# Patient Record
Sex: MALE | Race: WHITE
[De-identification: names, ages, dates, MRNs, and addresses within clinical notes are randomized per-mention and may not be internally consistent; named-entity substitution may affect disease eponyms.]

---

## 2024-05-13 ENCOUNTER — HOSPITAL ENCOUNTER (OUTPATIENT)
Dept: PHYSICAL THERAPY | Facility: CLINIC | Age: 34
Setting detail: THERAPIES SERIES
Discharge: HOME | End: 2024-05-13
Payer: COMMERCIAL

## 2024-05-13 PROCEDURE — 97161 PT EVAL LOW COMPLEX 20 MIN: CPT

## 2024-05-13 PROCEDURE — 97110 THERAPEUTIC EXERCISES: CPT

## 2024-05-13 NOTE — CONSULTS
[] [facility]  Outpatient Rehabilitation &  Therapy  [address]  [phone]  F: [phone] [] [facility]  Outpatient Rehabilitation &  Therapy  [address]   [address]  P: [phone]  F: [phone] [x] [facility]  Outpatient Rehabilitation &  Therapy  [address]  P: [phone]  F: [phone] [] [facility]  Outpatient Rehabilitation &  Therapy  [address]   P: [phone]  F: [phone]  [] [facility]  Outpatient Rehabilitation &  Therapy  [address]  P: [phone]  F: [phone]          Physical Therapy Upper Extremity Evaluation    Date:  2024  Patient: Jaylen Russ  :  1990  MRN: [id number]  Physician: Reese Delvalle MD     Insurance: [employer] ($50 copay, 60/60 remains, hard max)   Medical Diagnosis: M75.81 (ICD-10-CM) - Other shoulder lesions, right shoulder   Rehab Codes: M25.511, M25.611, R29.3, M62.81  Onset date: 3/1/24  Next 's appt.: TBD      Subjective:  Pt arrived to physical therapy with c/o recent onset of R shoulder pain without known cause.  Pt reported pain typically worsen after getting out of shower and discomfort when laying on R side causing \"sharp\" pain.  Pt reported prescribed oral steroid meds which help reducing symptoms and massage therapist also help relieving pain more than steroid pack.  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 (+) R hand dominant.     Pain present? [x] Yes  [] No    Location R shoulder    Pain Rating currently  (0-10 scale)  1/10   Pain at worse 5/10   Pain at best 0/10   Description of pain Intermittent ache, sharp   Altered Sensation Intermittent numbness in lower forearm    What makes it worse Getting out of shower, laying on R side   What makes it better Movements    Symptom progression

## 2024-05-23 ENCOUNTER — HOSPITAL ENCOUNTER (OUTPATIENT)
Dept: PHYSICAL THERAPY | Facility: CLINIC | Age: 34
Setting detail: THERAPIES SERIES
Discharge: HOME | End: 2024-05-23
Payer: COMMERCIAL

## 2024-05-23 PROCEDURE — 97110 THERAPEUTIC EXERCISES: CPT

## 2024-05-23 NOTE — FLOWSHEET NOTE
[] [facility]  Outpatient Rehabilitation &  Therapy  [address]  [phone]  [phone] [] [facility]  Outpatient Rehabilitation &  Therapy  [address]  P: [phone]  F: [phone] [x] [facility]  Outpatient Rehabilitation &  Therapy  [address]  P: [phone]  F: [phone] [] [facility]  Outpatient Rehabilitation &  Therapy  [address]  [phone]  [phone] [] [facility]  Outpatient Rehabilitation &  Therapy  [address]   P: [phone]  F: [phone]  [] [facility]  Outpatient Rehabilitation &  Therapy  [address]  P: [phone]  F: [phone] [] [facility]  Outpatient Rehabilitation &  Therapy  [address]  P: [phone]  F: [phone] [] [facility]  Outpatient Rehabilitation &  Therapy  [address]  P: [phone]  F: [phone] [] [facility]  Outpatient Rehabilitation &  Therapy  [address]  P: [phone]  F: [phone]  [] [facility] Outpatient Rehabilitation &  Therapy  [address]  P: [phone]  F: [phone]     Physical Therapy Daily Treatment Note    Date:  2024  Patient Name:  Jaylen Russ    :  1990  MRN: [id number]  Physician: Reese Delvalle MD                         Insurance: [employer] ($50 copay, 60/60 remains, hard max)   Medical Diagnosis: M75.81 (ICD-10-CM) - Other shoulder lesions, right shoulder   Rehab Codes: M25.511, M25.611, R29.3, M62.81  Onset date: 3/1/24                 Next Dr's appt.: TBD  Visit# / total visits:      Cancels/No Shows:     Subjective:    Pain:  [] Yes  [x] No Location: R shoulder Pain Rating: (0-10 scale) 0/10  Pain altered Tx:  [x] No  [] Yes  Action:  Comments: Pt with no pain 
2 to 4 times a month

## 2024-05-28 ENCOUNTER — HOSPITAL ENCOUNTER (OUTPATIENT)
Dept: PHYSICAL THERAPY | Facility: CLINIC | Age: 34
Setting detail: THERAPIES SERIES
Discharge: HOME | End: 2024-05-28
Payer: COMMERCIAL

## 2024-05-28 PROCEDURE — 97110 THERAPEUTIC EXERCISES: CPT

## 2024-05-28 NOTE — FLOWSHEET NOTE
[] [facility]  Outpatient Rehabilitation &  Therapy  [address]  [phone]  [phone] [] [facility]  Outpatient Rehabilitation &  Therapy  [address]  P: [phone]  F: [phone] [x] [facility]  Outpatient Rehabilitation &  Therapy  [address]  P: [phone]  F: [phone] [] [facility]  Outpatient Rehabilitation &  Therapy  [address]  [phone]  [phone] [] [facility]  Outpatient Rehabilitation &  Therapy  [address]   P: [phone]  F: [phone]  [] [facility]  Outpatient Rehabilitation &  Therapy  [address]  P: [phone]  F: [phone] [] [facility]  Outpatient Rehabilitation &  Therapy  [address]  P: [phone]  F: [phone] [] [facility]  Outpatient Rehabilitation &  Therapy  [address]  P: [phone]  F: [phone] [] [facility]  Outpatient Rehabilitation &  Therapy  [address]  P: [phone]  F: [phone]  [] [facility] Outpatient Rehabilitation &  Therapy  [address]  P: [phone]  F: [phone]     Physical Therapy Daily Treatment Note    Date:  2024  Patient Name:  Jaylen Russ    :  1990  MRN: [id number]  Physician: Reese Delvalle MD                         Insurance: [employer] ($50 copay, 60/60 remains, hard max)   Medical Diagnosis: M75.81 (ICD-10-CM) - Other shoulder lesions, right shoulder   Rehab Codes: M25.511, M25.611, R29.3, M62.81  Onset date: 3/1/24                 Next Dr's appt.: TBD  Visit# / total visits: 3/8     Cancels/No Shows:     Subjective:    Pain:  [] Yes  [x] No Location: R shoulder Pain Rating: (0-10 scale) 0/10  Pain altered Tx:  [x] No  [] Yes  Action:  Comments: Pt arrived to

## 2024-06-04 ENCOUNTER — HOSPITAL ENCOUNTER (OUTPATIENT)
Dept: PHYSICAL THERAPY | Facility: CLINIC | Age: 34
Setting detail: THERAPIES SERIES
Discharge: HOME | End: 2024-06-04
Payer: COMMERCIAL

## 2024-06-04 PROCEDURE — 97110 THERAPEUTIC EXERCISES: CPT

## 2024-06-04 NOTE — FLOWSHEET NOTE
5sec hold  - Standing Shoulder Horizontal Abduction with Resistance  - 1 x daily - 7 x weekly - 2 sets - 10 reps     Comprehension of Education:  [x] Verbalizes understanding.  [x] Demonstrates understanding.  [] Needs review.   [] Demonstrates/verbalizes HEP/Ed previously given.     Plan: [x] Continue current frequency toward long and short term goals.    [x] Specific Instructions for subsequent treatments: see above      Time In: 6:02 pm            Time Out: 6:58 pm    Electronically signed by:  Nubia Buitrago PT

## 2024-06-11 ENCOUNTER — HOSPITAL ENCOUNTER (OUTPATIENT)
Dept: PHYSICAL THERAPY | Facility: CLINIC | Age: 34
Setting detail: THERAPIES SERIES
Discharge: HOME | End: 2024-06-11
Payer: COMMERCIAL

## 2024-06-11 PROCEDURE — 97110 THERAPEUTIC EXERCISES: CPT

## 2024-06-11 NOTE — FLOWSHEET NOTE
[] [facility]  Outpatient Rehabilitation &  Therapy  [address]  [phone]  [phone] [] [facility]  Outpatient Rehabilitation &  Therapy  [address]  P: [phone]  F: [phone] [x] [facility]  Outpatient Rehabilitation &  Therapy  [address]  P: [phone]  F: [phone] [] [facility]  Outpatient Rehabilitation &  Therapy  [address]  [phone]  [phone] [] [facility]  Outpatient Rehabilitation &  Therapy  [address]   P: [phone]  F: [phone]  [] [facility]  Outpatient Rehabilitation &  Therapy  [address]  P: [phone]  F: [phone] [] [facility]  Outpatient Rehabilitation &  Therapy  [address]  P: [phone]  F: [phone] [] [facility]  Outpatient Rehabilitation &  Therapy  [address]  P: [phone]  F: [phone] [] [facility]  Outpatient Rehabilitation &  Therapy  [address]  P: [phone]  F: [phone]  [] [facility] Outpatient Rehabilitation &  Therapy  [address]  P: [phone]  F: [phone]     Physical Therapy Daily Treatment Note    Date:  2024  Patient Name:  Jaylen Russ    :  1990  MRN: [id number]  Physician: Reese Delvalle MD                         Insurance: [employer] ($50 copay, 60/60 remains, hard max)   Medical Diagnosis: M75.81 (ICD-10-CM) - Other shoulder lesions, right shoulder   Rehab Codes: M25.511, M25.611, R29.3, M62.81  Onset date: 3/1/24                 Next Dr's appt.: TBD  Visit# / total visits:      Cancels/No Shows:     Subjective:    Pain:  [x] Yes  [] No Location: R shoulder Pain Rating: (0-10 scale) 0/10  Pain altered Tx:  [x] No  [] Yes  Action:  Comments: Pt states

## 2024-06-18 ENCOUNTER — HOSPITAL ENCOUNTER (OUTPATIENT)
Dept: PHYSICAL THERAPY | Facility: CLINIC | Age: 34
Setting detail: THERAPIES SERIES
Discharge: HOME | End: 2024-06-18
Payer: COMMERCIAL

## 2024-06-18 PROCEDURE — 97110 THERAPEUTIC EXERCISES: CPT

## 2024-06-18 NOTE — FLOWSHEET NOTE
BUE/scapulothoracic stabilizers with good tolerance noted.  Progress standing theraband exs includes flexion pulse, wall tap, and added wall walk to further progress strength and stability of shoulder/periscapular ms without any issue noted.  Pt denied inc of pain post-exs.      [] No change.     [] Other:  [x] Patient would continue to benefit from skilled physical therapy services in order to: meet goals listed below     STG: (to be met in 8 treatments)  Pt will decrease pain to less than or equal to 3/10 with ADLs  Pt will improve B shoulder AROM to at least 160° flexion/abduction with report of minimal pain to ease difficulty with overhead activities  Pt will improve B shoulder strength to at least 4/5 and scapulothoracic stabilizers strength to at least 4-/5 throughout to ease difficulty with prolonged driving at work   Patient to be independent with home exercise program as demonstrated by performance with correct form without cues.  Pt will improve UEFS score to <3.25% impaired to demo improved functional mobility to participate in daily functional activities                  Patient goals: \"to figure out what is going on\"        Pt. Education:  [] Yes  [x] No  [] Reviewed Prior HEP/Ed  Method of Education: [] Verbal  [] Demo  [] Written  Access Code: [email or web address]  URL: [email or web address]  Date: 05/13/2024  Prepared by: Nubia Buitrago     Exercises  - Seated Cervical Sidebending Stretch  - 1 x daily - 7 x weekly - 1 sets - 3 reps - 30sec hold  - Seated Levator Scapulae Stretch  - 1 x daily - 7 x weekly - 1 sets - 3 reps - 30sec hold  - Seated Cervical Retraction  - 1 x daily - 7 x weekly - 2 sets - 10 reps - 5sec hold  - Seated Scapular Retraction  - 1 x daily - 7 x weekly - 2 sets - 10 reps - 5sec hold  - Doorway Pec Stretch at 90 Degrees Abduction  - 1 x daily - 7 x weekly - 1 sets - 3 reps - 30sec hold      Date: 06/04/2024  Prepared by: Nubia Martell  - Prone Scapular Slide with Shoulder Extension

## 2024-06-26 ENCOUNTER — HOSPITAL ENCOUNTER (OUTPATIENT)
Dept: PHYSICAL THERAPY | Facility: CLINIC | Age: 34
Setting detail: THERAPIES SERIES
Discharge: HOME | End: 2024-06-26
Payer: COMMERCIAL

## 2024-06-26 PROCEDURE — 97110 THERAPEUTIC EXERCISES: CPT

## 2024-06-26 NOTE — FLOWSHEET NOTE
[] [facility]  Outpatient Rehabilitation &  Therapy  [address]  [phone]  [phone] [] [facility]  Outpatient Rehabilitation &  Therapy  [address]  P: [phone]  F: [phone] [x] [facility]  Outpatient Rehabilitation &  Therapy  [address]  P: [phone]  F: [phone] [] [facility]  Outpatient Rehabilitation &  Therapy  [address]  [phone]  [phone] [] [facility]  Outpatient Rehabilitation &  Therapy  [address]   P: [phone]  F: [phone]  [] [facility]  Outpatient Rehabilitation &  Therapy  [address]  P: [phone]  F: [phone] [] [facility]  Outpatient Rehabilitation &  Therapy  [address]  P: [phone]  F: [phone] [] [facility]  Outpatient Rehabilitation &  Therapy  [address]  P: [phone]  F: [phone] [] [facility]  Outpatient Rehabilitation &  Therapy  [address]  P: [phone]  F: [phone]  [] [facility] Outpatient Rehabilitation &  Therapy  [address]  P: [phone]  F: [phone]     Physical Therapy Daily Treatment Note    Date:  2024  Patient Name:  Jaylen Russ    :  1990  MRN: [id number]  Physician: Reese Delvalle MD                         Insurance: [employer]EF[employer] ($50 copay, 60/60 remains, hard max)   Medical Diagnosis: M75.81 (ICD-10-CM) - Other shoulder lesions, right shoulder   Rehab Codes: M25.511, M25.611, R29.3, M62.81  Onset date: 3/1/24                 Next Dr's appt.: TBD  Visit# / total visits:      Cancels/No Shows:     Subjective:    Pain:  [] Yes  [x] No Location: R shoulder Pain Rating: (0-10 scale) 0/10  Pain altered Tx:  [x] No  [] Yes  Action:  Comments: Pt arrived to

## 2024-07-02 ENCOUNTER — HOSPITAL ENCOUNTER (OUTPATIENT)
Dept: PHYSICAL THERAPY | Facility: CLINIC | Age: 34
Setting detail: THERAPIES SERIES
Discharge: HOME | End: 2024-07-02
Payer: COMMERCIAL

## 2024-07-02 PROCEDURE — 97110 THERAPEUTIC EXERCISES: CPT

## 2024-07-02 NOTE — DISCHARGE SUMMARY
[] [facility]  Outpatient Rehabilitation &  Therapy  [address]  [phone]  [phone] [] [facility]  Outpatient Rehabilitation &  Therapy  [address]  P: [phone]  F: [phone] [x] [facility]  Outpatient Rehabilitation &  Therapy  [address]  P: [phone]  F: [phone] [] [facility]  Outpatient Rehabilitation &  Therapy  [address]  [phone]  [phone] [] [facility]  Outpatient Rehabilitation &  Therapy  [address]   P: [phone]  F: [phone]  [] [facility]  Outpatient Rehabilitation &  Therapy  [address]  P: [phone]  F: [phone] [] [facility]  Outpatient Rehabilitation &  Therapy  [address]  P: [phone]  F: [phone] [] [facility]  Outpatient Rehabilitation &  Therapy  [address]  P: [phone]  F: [phone] [] [facility]  Outpatient Rehabilitation &  Therapy  [address]  P: [phone]  F: [phone]  [] [facility] Outpatient Rehabilitation &  Therapy  [address]  P: [phone]  F: [phone]     Physical Therapy Discharge Note    Date: 2024      Patient: Jaylen Russ  : 1990  MRN: [id number]    Physician: Reese Delvalle MD                         Insurance: [employer]EF[employer] ($50 copay, 60/60 remains, hard max)   Medical Diagnosis: M75.81 (ICD-10-CM) - Other shoulder lesions, right shoulder   Rehab Codes: M25.511, M25.611, R29.3, M62.81  Onset date: 3/1/24                 Next 's appt.: TBD    Total visits attended: 8  Cancels/No shows: 0  Date of initial visit: 24                Date of final visit: 24     Subjective:    Pain:  [] Yes  [x] No   Location: R shoulder   Pain Rating: (0-10 scale)

## 2024-07-02 NOTE — FLOWSHEET NOTE
5/5 5/5   Shld Ext                       Lats    5/5 5/5   Mid Trap    5/5 5/5    Lower Trap    5/5 5/5              Assessment: [x] Progressing toward goals.  Re-assessment of all goals and overall function to prepare for discharged.  Pt demo significant improvement in overall pain level, stated max pain along R lateral shoulder/upper arm area only inc up to 2/10 and recovery time is much faster.  Pt demo improved BUE and scapulothoracic stabilizers strength globally, improve UEFS score, and minimal pain noted with end-range flexion/abduction and resisted testing.  Issued updated HEP and blue/purple theraband to further improve progression after discharged.  Educated pt on importance and benefit of completing HEP in a consistent manner to prevent regression.  Pt verbalized understanding.  Pt is discharged.     [] No change.     [] Other:  [] Patient would continue to benefit from skilled physical therapy services in order to: meet goals listed below     STG: (to be met in 8 treatments) - Re-assessed on 7/2/24 by An Minna, PT  Pt will decrease pain to less than or equal to 3/10 with ADLs - MET (2/10 max, faster recovery time)   Pt will improve B shoulder AROM to at least 160° flexion/abduction with report of minimal pain to ease difficulty with overhead activities - MET  Pt will improve B shoulder strength to at least 4/5 and scapulothoracic stabilizers strength to at least 4-/5 throughout to ease difficulty with prolonged driving at work - MET  Patient to be independent with home exercise program as demonstrated by performance with correct form without cues. - MET  Pt will improve UEFS score to <3.25% impaired to demo improved functional mobility to participate in daily functional activities - MET (78/80 or 2.5% impaired)                  Patient goals: \"to figure out what is going on\" - MET       Pt. Education:  [x] Yes  [] No  [] Reviewed Prior HEP/Ed  Method of Education: [x] Verbal  [] Demo  [x]